# Patient Record
Sex: MALE | NOT HISPANIC OR LATINO | Employment: OTHER | URBAN - NONMETROPOLITAN AREA
[De-identification: names, ages, dates, MRNs, and addresses within clinical notes are randomized per-mention and may not be internally consistent; named-entity substitution may affect disease eponyms.]

---

## 2023-02-16 ENCOUNTER — HOSPITAL ENCOUNTER (EMERGENCY)
Dept: DATA CONVERSION | Facility: HOSPITAL | Age: 88
Discharge: ANOTHER HOSPITAL | End: 2023-02-16
Attending: FAMILY MEDICINE | Admitting: INTERNAL MEDICINE

## 2023-02-16 DIAGNOSIS — Z79.82 LONG TERM (CURRENT) USE OF ASPIRIN: ICD-10-CM

## 2023-02-16 DIAGNOSIS — S00.83XA CONTUSION OF OTHER PART OF HEAD, INITIAL ENCOUNTER: ICD-10-CM

## 2023-02-16 DIAGNOSIS — Z79.899 OTHER LONG TERM (CURRENT) DRUG THERAPY: ICD-10-CM

## 2023-02-16 DIAGNOSIS — H57.89 OTHER SPECIFIED DISORDERS OF EYE AND ADNEXA: ICD-10-CM

## 2023-02-16 DIAGNOSIS — Z20.822 CONTACT WITH AND (SUSPECTED) EXPOSURE TO COVID-19: ICD-10-CM

## 2023-02-16 DIAGNOSIS — Z51.5 ENCOUNTER FOR PALLIATIVE CARE: ICD-10-CM

## 2023-02-16 DIAGNOSIS — E03.9 HYPOTHYROIDISM, UNSPECIFIED: ICD-10-CM

## 2023-02-16 DIAGNOSIS — Z95.3 PRESENCE OF XENOGENIC HEART VALVE: ICD-10-CM

## 2023-02-16 DIAGNOSIS — J01.90 ACUTE SINUSITIS, UNSPECIFIED: ICD-10-CM

## 2023-02-16 DIAGNOSIS — R26.9 UNSPECIFIED ABNORMALITIES OF GAIT AND MOBILITY: ICD-10-CM

## 2023-02-16 DIAGNOSIS — L50.9 URTICARIA, UNSPECIFIED: ICD-10-CM

## 2023-02-16 DIAGNOSIS — R60.0 LOCALIZED EDEMA: ICD-10-CM

## 2023-02-16 DIAGNOSIS — R94.31 ABNORMAL ELECTROCARDIOGRAM (ECG) (EKG): ICD-10-CM

## 2023-02-16 DIAGNOSIS — S50.819A ABRASION OF UNSPECIFIED FOREARM, INITIAL ENCOUNTER: ICD-10-CM

## 2023-02-16 DIAGNOSIS — R22.0 LOCALIZED SWELLING, MASS AND LUMP, HEAD: ICD-10-CM

## 2023-02-16 DIAGNOSIS — S09.90XA UNSPECIFIED INJURY OF HEAD, INITIAL ENCOUNTER: ICD-10-CM

## 2023-02-16 DIAGNOSIS — M62.82 RHABDOMYOLYSIS: ICD-10-CM

## 2023-02-16 DIAGNOSIS — S09.8XXA OTHER SPECIFIED INJURIES OF HEAD, INITIAL ENCOUNTER: ICD-10-CM
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
## 2023-02-16 DIAGNOSIS — Z66 DO NOT RESUSCITATE: ICD-10-CM
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
## 2023-02-16 DIAGNOSIS — R77.8 OTHER SPECIFIED ABNORMALITIES OF PLASMA PROTEINS: ICD-10-CM

## 2023-02-16 DIAGNOSIS — Z95.0 PRESENCE OF CARDIAC PACEMAKER: ICD-10-CM

## 2023-02-16 LAB
ALANINE AMINOTRANSFERASE (SGPT) (U/L) IN SER/PLAS: 19 U/L (ref 10–52)
ALBUMIN (G/DL) IN SER/PLAS: 3.7 G/DL (ref 3.4–5)
ALKALINE PHOSPHATASE (U/L) IN SER/PLAS: 83 U/L (ref 33–136)
ANION GAP IN SER/PLAS: 15 MMOL/L (ref 10–20)
ASPARTATE AMINOTRANSFERASE (SGOT) (U/L) IN SER/PLAS: 38 U/L (ref 9–39)
BASOPHILS (10*3/UL) IN BLOOD BY AUTOMATED COUNT: 0.01 X10E9/L (ref 0–0.1)
BASOPHILS/100 LEUKOCYTES IN BLOOD BY AUTOMATED COUNT: 0.1 % (ref 0–2)
BILIRUBIN TOTAL (MG/DL) IN SER/PLAS: 1.1 MG/DL (ref 0–1.2)
CALCIUM (MG/DL) IN SER/PLAS: 9.1 MG/DL (ref 8.6–10.3)
CARBON DIOXIDE, TOTAL (MMOL/L) IN SER/PLAS: 26 MMOL/L (ref 21–32)
CHLORIDE (MMOL/L) IN SER/PLAS: 102 MMOL/L (ref 98–107)
CREATINE KINASE (U/L) IN SER/PLAS: 718 U/L (ref 0–325)
CREATININE (MG/DL) IN SER/PLAS: 1.09 MG/DL (ref 0.5–1.3)
ERYTHROCYTE DISTRIBUTION WIDTH (RATIO) BY AUTOMATED COUNT: 13.7 % (ref 11.5–14.5)
ERYTHROCYTE MEAN CORPUSCULAR HEMOGLOBIN CONCENTRATION (G/DL) BY AUTOMATED: 33 G/DL (ref 32–36)
ERYTHROCYTE MEAN CORPUSCULAR VOLUME (FL) BY AUTOMATED COUNT: 96 FL (ref 80–100)
ERYTHROCYTES (10*6/UL) IN BLOOD BY AUTOMATED COUNT: 4.27 X10E12/L (ref 4.5–5.9)
FLU A RESULT: NOT DETECTED
FLU B RESULT: NOT DETECTED
GFR MALE: 64 ML/MIN/1.73M2
GLUCOSE (MG/DL) IN SER/PLAS: 120 MG/DL (ref 74–99)
HEMATOCRIT (%) IN BLOOD BY AUTOMATED COUNT: 41.2 % (ref 41–52)
HEMOGLOBIN (G/DL) IN BLOOD: 13.6 G/DL (ref 13.5–17.5)
IMMATURE GRANULOCYTES/100 LEUKOCYTES IN BLOOD BY AUTOMATED COUNT: 0.6 % (ref 0–0.9)
INR IN PPP BY COAGULATION ASSAY: 1.3 (ref 0.9–1.1)
LEUKOCYTES (10*3/UL) IN BLOOD BY AUTOMATED COUNT: 7.8 X10E9/L (ref 4.4–11.3)
LYMPHOCYTES (10*3/UL) IN BLOOD BY AUTOMATED COUNT: 0.26 X10E9/L (ref 0.8–3)
LYMPHOCYTES/100 LEUKOCYTES IN BLOOD BY AUTOMATED COUNT: 3.4 % (ref 13–44)
MAGNESIUM (MG/DL) IN SER/PLAS: 2.04 MG/DL (ref 1.6–2.4)
MONOCYTES (10*3/UL) IN BLOOD BY AUTOMATED COUNT: 0.52 X10E9/L (ref 0.05–0.8)
MONOCYTES/100 LEUKOCYTES IN BLOOD BY AUTOMATED COUNT: 6.7 % (ref 2–10)
NEUTROPHILS (10*3/UL) IN BLOOD BY AUTOMATED COUNT: 6.91 X10E9/L (ref 1.6–5.5)
NEUTROPHILS/100 LEUKOCYTES IN BLOOD BY AUTOMATED COUNT: 89.2 % (ref 40–80)
PLATELETS (10*3/UL) IN BLOOD AUTOMATED COUNT: 186 X10E9/L (ref 150–450)
POTASSIUM (MMOL/L) IN SER/PLAS: 4.5 MMOL/L (ref 3.5–5.3)
PROTEIN TOTAL: 6.3 G/DL (ref 6.4–8.2)
PROTHROMBIN TIME (PT) IN PPP BY COAGULATION ASSAY: 15.2 SEC (ref 9.8–13.4)
SARS-COV-2 RESULT: NOT DETECTED
SODIUM (MMOL/L) IN SER/PLAS: 138 MMOL/L (ref 136–145)
TROPONIN I, HIGH SENSITIVITY: 120 NG/L (ref 0–20)
TROPONIN I, HIGH SENSITIVITY: 138 NG/L (ref 0–20)
TROPONIN I, HIGH SENSITIVITY: 149 NG/L (ref 0–20)
TROPONIN I, HIGH SENSITIVITY: 161 NG/L (ref 0–20)
UREA NITROGEN (MG/DL) IN SER/PLAS: 29 MG/DL (ref 6–23)

## 2023-02-17 LAB
ANION GAP IN SER/PLAS: 8 MMOL/L (ref 10–20)
CALCIUM (MG/DL) IN SER/PLAS: 8.3 MG/DL (ref 8.6–10.3)
CARBON DIOXIDE, TOTAL (MMOL/L) IN SER/PLAS: 27 MMOL/L (ref 21–32)
CHLORIDE (MMOL/L) IN SER/PLAS: 106 MMOL/L (ref 98–107)
CREATININE (MG/DL) IN SER/PLAS: 0.88 MG/DL (ref 0.5–1.3)
ERYTHROCYTE DISTRIBUTION WIDTH (RATIO) BY AUTOMATED COUNT: 13.8 % (ref 11.5–14.5)
ERYTHROCYTE MEAN CORPUSCULAR HEMOGLOBIN CONCENTRATION (G/DL) BY AUTOMATED: 33 G/DL (ref 32–36)
ERYTHROCYTE MEAN CORPUSCULAR VOLUME (FL) BY AUTOMATED COUNT: 97 FL (ref 80–100)
ERYTHROCYTES (10*6/UL) IN BLOOD BY AUTOMATED COUNT: 4.14 X10E12/L (ref 4.5–5.9)
GFR MALE: 81 ML/MIN/1.73M2
GLUCOSE (MG/DL) IN SER/PLAS: 82 MG/DL (ref 74–99)
HEMATOCRIT (%) IN BLOOD BY AUTOMATED COUNT: 40 % (ref 41–52)
HEMOGLOBIN (G/DL) IN BLOOD: 13.2 G/DL (ref 13.5–17.5)
LEUKOCYTES (10*3/UL) IN BLOOD BY AUTOMATED COUNT: 6.2 X10E9/L (ref 4.4–11.3)
PLATELETS (10*3/UL) IN BLOOD AUTOMATED COUNT: 175 X10E9/L (ref 150–450)
POTASSIUM (MMOL/L) IN SER/PLAS: 4.1 MMOL/L (ref 3.5–5.3)
SODIUM (MMOL/L) IN SER/PLAS: 137 MMOL/L (ref 136–145)
TROPONIN I, HIGH SENSITIVITY: 126 NG/L (ref 0–20)
TROPONIN I, HIGH SENSITIVITY: 143 NG/L (ref 0–20)
UREA NITROGEN (MG/DL) IN SER/PLAS: 26 MG/DL (ref 6–23)

## 2023-03-03 NOTE — H&P
History of Present Illness:   HPI:    ELLIOT CAAL is a 89 year old patient who normally lives with his wife; however, his wife is sick and hospitalized at [facility] . Patient was home alone, he fell  last night reportedly between 7 and 8 and has been on the floor all night.  He was brought to the emergency room for evaluation with history after he fell hit his head against the table in the kitchen was on the floor was unable to get up.  Patient daughter  went to check on him and found him on the floor and stated that he was fine last night at 7:30 PM when she talked to him of the phone last night/  She found him on the phone with swelling of the face his eyes were all shut down due to edema of the face.  He could only give limited history denies any neck pain chest pain or short of breath.  He was noted abrasion of forearm.  Possibly when he tried  to get up.  Patient is not using any blood thinner.    In the ED, Repeat EKG done at 1525 hrs. showed pacer rate of 81.  Lateral infarct age undetermined.  No ST-T wave elevation.  No STEMI.  Abnormal EKG.  I read this EKG.    The patient CT of the head and C-spine as well as facial bones showed no acute fracture. He was found to have acute sinusitis but no posttraumatic abnormality except soft tissue swelling of the face and isolated from part of body being on the floor for  prolonged period of time.  CT of the chest abdomen pelvis showed no acute intra thoracic or abdominal posttraumatic abnormality CT, see CT report for details.  Patient troponin however was 120, 128 and 139 slightly creeping up.  His EKG showed pacemaker rhythm with nonspecific T wave changes no STEMI.    Rest of the labs are also reviewed with the patient and family.    Patient gradually became more awake his puffiness and swelling is markedly improved he is able to open his eyes, the puffiness and edema has markedly improved when he put his head in upright position causing edema to  decrease.  He was able to talk.  Patient  expressed desire for no intervention.  Initial discussion about transferring patient to higher level facility surgery [place] with decline in the family initially thought about sending patient [facility] or [facility]. [facility] declined to take the patient  reported request [facility] however patient and family decided no intervention at no cardiac cath no intervention no surgery they want patient to be DNR Comfort Care arrest and do not want any interventional procedure.  Not even cardiac cath  evaluation.  They agree they want him only comfort care arrest with only symptomatic treatment.  Patient and family understood the serious nature of the decision and major impact about patient medical care, they understood risk and benefit well patient  does not want any intervention or procedures and want comfort care and symptomatic treatment only.  Since he lives alone he cannot go home his wife in the hospital and is not safe to be home alone and they decided patient to be admitted to [facility]  for observation care and if gets discharged he will go home with her otherwise stable discussed different options including placement to skilled care facility or rehab.  Patient without any chest pain or short of breath.  He is being admitted for further  care.  But no cardiac intervention and no surgery per patient family and patient did not instructions on the DNR Comfort Care arrest.     Patient is admitted to medicine for possible placement and/or rehab    Family History:   Family History: reviewed and not pertinent to presenting  problem     Social History:   Social History:  Smoking Status never smoker (1)   Alcohol Use denies(1)   Drug Use denies (1)              Allergies:  ·  No Known Allergies :     Medications Prior to Admission:     levothyroxine 200 mcg (0.2 mg) oral tablet: 1 tab(s) orally once a day  Tylenol 325 mg oral capsule: 2 tab(s) orally every 6 hours, As  Needed  Aspir 81 oral delayed release tablet: 1 tab(s) orally once a day  Erleada 60 mg oral tablet: 4 tab(s) orally once a day  Colace 100 mg oral capsule: 1 cap(s) orally once a day, As Needed  lactulose 10 g/15 mL oral syrup: 15 milliliter(s) orally once a day (at bedtime), As Needed  mirtazapine 15 mg oral tablet: 1 tab(s) orally once a day (at bedtime)  furosemide 40 mg oral tablet: 1 tab(s) orally once a day  spironolactone 25 mg oral tablet: 1 tab(s) orally once a day  Os-Claude 250 with D oral tablet: 1 tab(s) orally 2 times a day  Vimpat 150 mg oral tablet: 1 tab(s) orally 2 times a day.    Review of Systems:   Constitutional: NEGATIVE: Fever, Chills, Anorexia,  Weight Loss, Malaise     Eyes: NEGATIVE: Blurry Vision, Drainage, Diploplia,  Redness, Vision Loss/ Change     ENMT: NEGATIVE: Nasal Discharge, Nasal Congestion,  Ear Pain, Mouth Pain, Throat Pain     Respiratory: NEGATIVE: Dry Cough, Productive Cough,  Hemoptysis, Wheezing, Shortness of Breath     Cardiac: NEGATIVE: Chest Pain, Dyspnea on Exertion,  Orthopnea, Palpitations, Syncope     Gastrointestinal: NEGATIVE: Nausea, Vomiting, Diarrhea,  Constipation, Abdominal Pain     Genitourinary: NEGATIVE: Discharge, Dysuria, Flank  Pain, Frequency, Hematuria     Musculoskeletal: NEGATIVE: Decreased ROM, Pain, Swelling,  Stiffness, Weakness     Neurological: NEGATIVE: Dizziness, Confusion, Headache,  Seizures, Syncope     Psychiatric: NEGATIVE: Mood Changes, Anxiety, Hallucinations,  Sleep Changes, Suicidal Ideas     Skin: NEGATIVE: Mass, Pain, Pruritus, Rash, Ulcer     Endocrine: NEGATIVE: Heat Intolerance, Cold Intolerance,  Sweat, Polyuria, Thirst     Hematologic/Lymph: NEGATIVE: Anemia, Bruising, Easy  Bleeding, Night Sweats, Petechiae       Objective:     Objective Information:      T   P  R  BP   MAP  SpO2   Value  36.2  62  18  91/43   72  98%  Date/Time 2/17 8:00 2/17 8:00 2/17 8:00 2/17 8:00  2/16 17:26 2/17 8:00  Range  (36.2C - 37C )  (62 - 82 )   (14 - 20 )  (76 - 105 )/ (43 - 67 )  (72 - 77 )  (87% - 100% )   As of 17-Feb-2023 03:00:00, patient is on 3 L/min of oxygen via nasal cannula with humidification.  Highest temp of 37 C was recorded at 2/16 11:57      Pain reported at 2/16 20:00: 0 = None    Physical Exam by System:    Constitutional: Well developed, awake/alert/oriented  x3, no distress, alert and cooperative   Eyes: PERRL, EOMI, clear sclera   ENMT: mucous membranes moist, no apparent injury,  no lesions seen   Head/Neck: Neck supple, no apparent injury, thyroid  without mass or tenderness, No JVD, trachea midline, no bruits   Respiratory/Thorax: Patent airways, CTAB, normal  breath sounds with good chest expansion, thorax symmetric   Cardiovascular: Regular, rate and rhythm, no murmurs,  2+ equal pulses of the extremities, normal S 1and S 2   Gastrointestinal: Nondistended, soft, non-tender,  no rebound tenderness or guarding, no masses palpable, no organomegaly, +BS, no bruits   Genitourinary: No Discharge, vesicles or other abnormalities   Musculoskeletal: ROM intact, no joint swelling, normal  strength   Extremities: normal extremities, no cyanosis edema,  contusions or wounds, no clubbing   Neurological: alert and oriented x3, intact senses,  motor, response and reflexes, normal strength   Skin: Warm and dry, no lesions, no rashes     Medications:    Medications:          Continuous Medications       --------------------------------    1. Sodium Chloride 0.9% Infusion:  1000  mL  IntraVenous  <Continuous>         Scheduled Medications       --------------------------------    1. Aspirin Enteric Coated:  81  mg  Oral  Daily    2. Calcium 500 mg - Vitamin D 200 Units:  1  tablet(s)  Oral  2 Times a Day    3. Furosemide:  40  mg  Oral  Daily    4. Lacosamide:  150  mg  Oral  2 Times a Day    5. Lactulose  Oral Liquid - PEDS:  10  gram(s)  Oral  Every Night    6. Levothyroxine:  200  microgram(s)  Oral  Daily    7. Mirtazapine  - PEDS:  15  mg   Oral  Every Night    8. Spironolactone:  25  mg  Oral  Daily         PRN Medications       --------------------------------    1. Acetaminophen:  650  mg  Oral  Every 4 Hours    2. Acetaminophen:  650  mg  Oral  Every 4 Hours    3. Docusate:  100  mg  Oral  2 Times a Day    4. Ondansetron Injectable:  4  mg  IntraVenous Push  Every 4 Hours    5. Sodium Chloride 0.9% Injectable Flush:  10  mL  IntraVenous Flush  Every 8 Hours and as Needed        Recent Lab Results:    Results:    CBC: 2/17/2023 03:35              \     Hgb     /                              \     13.2 L    /  WBC  ----------------  Plt               6.2       ----------------    175              /     Hct     \                              /     40.0 L    \            RBC: 4.14 L    MCV: 97     Neutrophil %: 89.2      BMP: 2/17/2023 03:35  NA+        Cl-     BUN  /                         137    106    26 H /  --------------------------------  Glucose                ---------------------------  82    K+     HCO3-   Creat \                         4.1  27    0.88  \  Calcium : 8.3 L    Anion Gap : 8 L      CMP: 2/16/2023 12:15  NA+        Cl-     BUN  /                         138    102    29 H /  --------------------------------  Glucose                ---------------------------  120 H    K+     HCO3-   Creat \                         4.5    26    1.09  \           \  T Bili  /                    \  1.1  /  AST  x ---- x ALT        38 x ---- x 19         /  Alk P   \               /  83  \  Calcium : 9.1     Anion Gap : 15     Albumin : 3.7     T Protein : 6.3 L          Coagulation: 2/16/2023 12:15  PT  /                    15.2 H /  -------<    INR          ----------<      1.3 H  PTT\                              \                         I have reviewed these laboratory results:    Complete Blood Count  17-Feb-2023 03:35:00      Result Value    White Blood Cell Count  6.2    Red Blood Cell Count  4.14   L   HGB  13.2   L   HCT   40.0   L   MCV  97    MCHC  33.0    PLT  175    RDW-CV  13.8      Basic Metabolic Panel  17-Feb-2023 03:35:00      Result Value    Glucose, Serum  82    NA  137    K  4.1    CL  106    Bicarbonate, Serum  27    Anion Gap, Serum  8   L   BUN  26   H   CREAT  0.88    GFR Male  81    Calcium, Serum  8.3   L     Troponin I, High Sensitivity  Trending View      Result 17-Feb-2023 03:35:00  17-Feb-2023 00:35:00  16-Feb-2023 21:33:00  16-Feb-2023 15:23:00  16-Feb-2023 13:20:00    Troponin I, High Sensitivity 126   H   143   H   161   H   149   H   138   H        Influenza A/B,Covid 2019 PCR,Symptomatic  16-Feb-2023 12:43:00      Result Value    Fluid Source  Nasal, Nasopharyngeal    Influenza A PCR  NOT DETECTED    Reference Range: Not Detected Respiratory virus testing is performed routinely by PCR    for Influenza A/B and RSV.    Not Detected results do not preclude Influenza A/B or RSV   infections since the adequacy of sample collection or lo    Influenza B PCR  NOT DETECTED    Reference Range: Not Detected Respiratory virus testing is performed routinely by PCR    for Influenza A/B and RSV.    Not Detected results do not preclude Influenza A/B or RSV   infections since the adequacy of sample collection or lo    Coronavirus 2019,PCR  NOT DETECTED    Reference Range: Not Detected .  This test has received FDA Emergency Use Authorization (EUA) and has been   verified by [facility]. This test is only   authorized for the duration of time that circums      Complete Blood Count + Differential  16-Feb-2023 12:15:00      Result Value    White Blood Cell Count  7.8    Red Blood Cell Count  4.27   L   HGB  13.6    HCT  41.2    MCV  96    MCHC  33.0    PLT  186    RDW-CV  13.7    Neutrophil %  89.2    Immature Granulocytes %  0.6    Lymphocyte %  3.4    Monocyte %  6.7    Basophil %  0.1    Neutrophil Count  6.91   H   Lymphocyte Count  0.26   L   Monocyte Count  0.52    Basophil Count  0.01       Comprehensive Metabolic Panel  16-Feb-2023 12:15:00      Result Value    Glucose, Serum  120   H   NA  138    K  4.5    CL  102    Bicarbonate, Serum  26    Anion Gap, Serum  15    BUN  29   H   CREAT  1.09    GFR Male  64    Calcium, Serum  9.1    ALB  3.7    ALKP  83    T Pro  6.3   L   T Bili  1.1    Alanine Aminotransferase, Serum  19    Aspartate Transaminase, Serum  38        Radiology Results:    Results:    No Results have been selected.  Please select Results from the Available Results list before marking as Reviewed.      Impression:    No CT evidence of facial bone fracture in this exam.     Nonspecific edema throughout the subcutaneous fat the face (mildly  greater on the right than on the left) and the neck (prominently  greater on the right than on the left).     Paranasal sinusitis as described.     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     CT Image Reconstruction 3D Volume and MIP [Feb 16 2023  1:39PM]      Impression:    No CT evidence of facial bone fracture in this exam.     Nonspecific edema throughout the subcutaneous fat the face (mildly  greater on the right than on the left) and the neck (prominently  greater on the right than on the left).     Paranasal sinusitis as described.     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     CT Facial Bones [Feb 16 2023  1:36PM]      Impression:    No CT evidence of cervical spine fracture in this exam.     Cervical spine DJD throughout as described.     Diffuse edema throughout the subcutaneous fat the neck, right much  greater.     CT C Spine without Contrast [Feb 16 2023  1:28PM]      Impression:    CHEST:  The exam was limited by non use of IVcontrast.     Old inferolateral right rib fracture deformities. Stable anterior  wedge deformity at T12. No acute, displaced rib fracture either side.  No acute thoracic spine fracture.     Previous heart surgery. Cardiomegaly.     Diffuse edema throughout the  subcutaneous fat of the chest wall,  right greater than left. Also, there are bilateral pleural effusions  which are mild-to-moderate in size, partially loculated on right.  There is atelectasis in both lower lobes and laterally on right  throughout the right chest, mild right-sided volume. Question patient  laying on the right side when found. Please correlate clinically.     Mild stable aneurysm the ascending aorta. Suspected underlying  pulmonary artery hypertension.        ABDOMEN/PELVIS:  The exam was limited by non use of oral or IV contrast.     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 Mild DJD in both hips  and both SI joints. No fracture of the lumbar spine or pelvis based  this exam.     Moderate diffuse retained colonic stool.     Mild diffuse mesenteric edema and mild diffuse edema throughout the  subcutaneous fat. Suspect anasarca. Cannot exclude mild free pelvic.     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 There  are multiple stones in mural diverticula laterally and posteriorly on  the right. No intraluminal bladder stone.     Several tiny nonobstructing bilateral renal stones.     Moderate aortoiliac calcifications.     Arthritic changes in the spine and pelvis as described. No CT  evidence of lumbar spine or pelvic fracture.     CT Chest Abdomen Pelvis without Contrast [Feb 16 2023  1:24PM]      Impression:    Advanced degenerative changes of the right wrist as described.     Ossific density dorsal to the carpal bones may be due to triquetral  injury of indeterminate age.     Xray Wrist Complete Min 3 View [Feb 16 2023  1:13PM]      Impression:    No evidence of acute cortical infarct or intracranial hemorrhage.     No evidence of intracranial hemorrhage or displaced skullfracture.     CT Head without Contrast [Feb 16 2023 12:46PM]      Assessment and Plan:   Assessment:    ELLIOT CAAL is a 89 year old patient who normally lives with his wife; however, his wife is sick  and hospitalized at [facility] . Patient was home alone, he fell  last night reportedly between 7 and 8 and has been on the floor all night.  He was brought to the emergency room for evaluation with history after he fell hit his head against the table in the kitchen was on the floor was unable to get up.  Patient daughter  went to check on him and found him on the floor and stated that he was fine last night at 7:30 PM when she talked to him of the phone last night/  She found him on the phone with swelling of the face his eyes were all shut down due to edema of the face.  He could only give limited history denies any neck pain chest pain or short of breath.  He was noted abrasion of forearm.  Possibly when he tried  to get up.  Patient is not using any blood thinner.    In the ED, Repeat EKG done at 1525 hrs. showed pacer rate of 81.  Lateral infarct age undetermined.  No ST-T wave elevation.  No STEMI.  Abnormal EKG.  I read this EKG.    The patient CT of the head and C-spine as well as facial bones showed no acute fracture. He was found to have acute sinusitis but no posttraumatic abnormality except soft tissue swelling of the face and isolated from part of body being on the floor for  prolonged period of time.  CT of the chest abdomen pelvis showed no acute intra thoracic or abdominal posttraumatic abnormality CT, see CT report for details.  Patient troponin however was 120, 128 and 139 slightly creeping up.  His EKG showed pacemaker rhythm with nonspecific T wave changes no STEMI.    Rest of the labs are also reviewed with the patient and family.    Patient gradually became more awake his puffiness and swelling is markedly improved he is able to open his eyes, the puffiness and edema has markedly improved when he put his head in upright position causing edema to decrease.  He was able to talk.  Patient  expressed desire for no intervention.  Initial discussion about transferring patient to higher level  facility surgery [place] with decline in the family initially thought about sending patient [facility] or [facility]. [facility] declined to take the patient  reported request [facility] however patient and family decided no intervention at no cardiac cath no intervention no surgery they want patient to be DNR Comfort Care arrest and do not want any interventional procedure.  Not even cardiac cath  evaluation.  They agree they want him only comfort care arrest with only symptomatic treatment.  Patient and family understood the serious nature of the decision and major impact about patient medical care, they understood risk and benefit well patient  does not want any intervention or procedures and want comfort care and symptomatic treatment only.  Since he lives alone he cannot go home his wife in the hospital and is not safe to be home alone and they decided patient to be admitted to [facility]  for observation care and if gets discharged he will go home with her otherwise stable discussed different options including placement to skilled care facility or rehab.  Patient without any chest pain or short of breath.  He is being admitted for further  care.  But no cardiac intervention and no surgery per patient family and patient did not instructions on the DNR Comfort Care arrest.     Patient is admitted to medicine for possible placement and/or rehab    1) deconditioning and mechanical fall- no permanent trauma; no bleeding or fracture-  continue PT and OT; will qualify for swing    2) Rhabdomyolysis.  I do not see a cpk order, so I will recheck and monitor renal function    3) gait abnormality- continue current management; PT/OT- patient would be good candidate for SNF/rehab      Electronic Signatures:  Kuldeep Gonzalez)  (Signed 17-Feb-2023 15:05)   Authored: History of Present Illness, Comorbidities,  Family History, Social History, Allergies, Medications Prior to Admission, Review of Systems, Objective,  "Assessment and Plan, Note Completion      Last Updated: 17-Feb-2023 15:05 by Kuldeep Gonzalez)    References:  1.  Data Referenced From \"Patient Profile - Adult v2\" 16-Feb-2023 18:26   "

## 2023-03-03 NOTE — DISCHARGE SUMMARY
Send Summary:   Discharge Summary Providers:  Provider Role Provider Name Specialty   · Referring [facility] So[facility] Medicine   · Attending Kuldeep Gonzalez    · Primary Vida Barber        Note Recipients: pcp       Discharge:    Summary:   Admission Date: .16-Feb-2023 11:52:00   Discharge Date: 17-Feb-2023   Attending Physician at Discharge: Kuldeep Gonzalez   Admission Reason: fall; rhabdo   Final Discharge Diagnoses: Closed head injury; rhabdo;  elevated troponin   Procedures: none   Condition at Discharge: Satisfactory   Disposition at Discharge: Swing Bed   Vital Signs:        T   P  R  BP   MAP  SpO2   Value  36.2  62  18  91/43   72  98%  Date/Time 2/17 8:00 2/17 8:00 2/17 8:00 2/17 8:00  2/16 17:26 2/17 8:00  Range  (36.2C - 37C )  (62 - 82 )  (14 - 20 )  (76 - 105 )/ (43 - 67 )  (72 - 77 )  (87% - 100% )   As of 17-Feb-2023 03:00:00, patient is on 3 L/min of oxygen via nasal cannula with humidification.  Highest temp of 37 C was recorded at 2/16 11:57    Date:            Weight/Scale Type:  Height:   16-Feb-2023 18:26  63.3  kg / bed  177.8  cm  Hospital Course:    ELLIOT CAAL is a 89 year old patient who normally lives with his wife; however, his wife is sick and hospitalized at [facility] . Patient was home alone, he fell  last night reportedly between 7 and 8 and has been on the floor all night.  He was brought to the emergency room for evaluation with history after he fell hit his head against the table in the kitchen was on the floor was unable to get up.  Patient daughter  went to check on him and found him on the floor and stated that he was fine last night at 7:30 PM when she talked to him of the phone last night/  She found him on the phone with swelling of the face his eyes were all shut down due to edema of the face.  He could only give limited history denies any neck pain chest pain or short of breath.  He was noted abrasion of forearm.  Possibly when he tried  to get up.  Patient is not  using any blood thinner.    In the ED, Repeat EKG done at 1525 hrs. showed pacer rate of 81.  Lateral infarct age undetermined.  No ST-T wave elevation.  No STEMI.  Abnormal EKG.  I read this EKG.    The patient CT of the head and C-spine as well as facial bones showed no acute fracture. He was found to have acute sinusitis but no posttraumatic abnormality except soft tissue swelling of the face and isolated from part of body being on the floor for  prolonged period of time.  CT of the chest abdomen pelvis showed no acute intra thoracic or abdominal posttraumatic abnormality CT, see CT report for details.  Patient troponin however was 120, 128 and 139 slightly creeping up.  His EKG showed pacemaker rhythm with nonspecific T wave changes no STEMI.    Rest of the labs are also reviewed with the patient and family.    Patient gradually became more awake his puffiness and swelling is markedly improved he is able to open his eyes, the puffiness and edema has markedly improved when he put his head in upright position causing edema to decrease.  He was able to talk.  Patient  expressed desire for no intervention.  Initial discussion about transferring patient to higher level facility surgery [facility] with decline in the family initially thought about sending patient [facility] or [facility]. [facility] declined to take the patient  reported request [facility] however patient and family decided no intervention at no cardiac cath no intervention no surgery they want patient to be DNR Comfort Care arrest and do not want any interventional procedure.  Not even cardiac cath  evaluation.  They agree they want him only comfort care arrest with only symptomatic treatment.  Patient and family understood the serious nature of the decision and major impact about patient medical care, they understood risk and benefit well patient  does not want any intervention or procedures and want comfort care and symptomatic treatment only.   Since he lives alone he cannot go home his wife in the hospital and is not safe to be home alone and they decided patient to be admitted to [facility]  for observation care and if gets discharged he will go home with her otherwise stable discussed different options including placement to skilled care facility or rehab.  Patient without any chest pain or short of breath.  He is being admitted for further  care.  But no cardiac intervention and no surgery per patient family and patient did not instructions on the DNR Comfort Care arrest.     Patient is admitted to medicine for possible placement and/or rehab    1) deconditioning and mechanical fall- no permanent trauma; no bleeding or fracture-  continue PT and OT; will qualify for swing    2) Rhabdomyolysis.  I do not see a cpk order, so I will recheck and monitor renal function    3) gait abnormality- continue current management; PT/OT- patient would be good candidate for SNF/rehab    Immunizations:    Immunizations:  25-Oct-2020   Tdap: Immunizations, 25-Oct-2020      Discharge Information:    and Continuing Care:   Lab Results - Pending:    None  Radiology Results - Pending: None   Discharge Instructions:    a  Discharge Medications: a   Issues to Discuss at Follow-up / Goals for Continuing Care:    a      Electronic Signatures:  Kuldeep Gonzalez)  (Signed 17-Feb-2023 15:14)   Authored: Send Summary, Summary Content, Immunizations,  Ongoing Care, Note Completion      Last Updated: 17-Feb-2023 15:14 by Kuldeep Gonzalez)

## 2023-09-14 NOTE — DISCHARGE SUMMARY
Send Summary:   Discharge Summary Providers:  Provider Role Provider Name Specialty   · Referring Angelica Soares Medicine   · Attending Kuldeep Gonzalez    · Primary Vida Barber        Note Recipients: pcp       Discharge:    Summary:   Admission Date: .16-Feb-2023 11:52:00   Discharge Date: 17-Feb-2023   Attending Physician at Discharge: Kuldeep Gonzalez   Admission Reason: fall; rhabdo   Final Discharge Diagnoses: Closed head injury; rhabdo;  elevated troponin   Procedures: none   Condition at Discharge: Satisfactory   Disposition at Discharge: Swing Bed   Vital Signs:        T   P  R  BP   MAP  SpO2   Value  36.2  62  18  91/43   72  98%  Date/Time 2/17 8:00 2/17 8:00 2/17 8:00 2/17 8:00  2/16 17:26 2/17 8:00  Range  (36.2C - 37C )  (62 - 82 )  (14 - 20 )  (76 - 105 )/ (43 - 67 )  (72 - 77 )  (87% - 100% )   As of 17-Feb-2023 03:00:00, patient is on 3 L/min of oxygen via nasal cannula with humidification.  Highest temp of 37 C was recorded at 2/16 11:57    Date:            Weight/Scale Type:  Height:   16-Feb-2023 18:26  63.3  kg / bed  177.8  cm  Hospital Course:    ELLIOT CAAL is a 89 year old patient who normally lives with his wife; however, his wife is sick and hospitalized at [facility] . Patient was home alone, he fell  last night reportedly between 7 and 8 and has been on the floor all night.  He was brought to the emergency room for evaluation with history after he fell hit his head against the table in the kitchen was on the floor was unable to get up.  Patient daughter  went to check on him and found him on the floor and stated that he was fine last night at 7:30 PM when she talked to him of the phone last night/  She found him on the phone with swelling of the face his eyes were all shut down due to edema of the face.  He could only give limited history denies any neck pain chest pain or short of breath.  He was noted abrasion of forearm.  Possibly when he tried  to get up.  Patient is not  using any blood thinner.    In the ED, Repeat EKG done at 1525 hrs. showed pacer rate of 81.  Lateral infarct age undetermined.  No ST-T wave elevation.  No STEMI.  Abnormal EKG.  I read this EKG.    The patient CT of the head and C-spine as well as facial bones showed no acute fracture. He was found to have acute sinusitis but no posttraumatic abnormality except soft tissue swelling of the face and isolated from part of body being on the floor for  prolonged period of time.  CT of the chest abdomen pelvis showed no acute intra thoracic or abdominal posttraumatic abnormality CT, see CT report for details.  Patient troponin however was 120, 128 and 139 slightly creeping up.  His EKG showed pacemaker rhythm with nonspecific T wave changes no STEMI.    Rest of the labs are also reviewed with the patient and family.    Patient gradually became more awake his puffiness and swelling is markedly improved he is able to open his eyes, the puffiness and edema has markedly improved when he put his head in upright position causing edema to decrease.  He was able to talk.  Patient  expressed desire for no intervention.  Initial discussion about transferring patient to higher level facility surgery [facility] with decline in the family initially thought about sending patient [facility] or [facility]. [facility] declined to take the patient  reported request [facility] however patient and family decided no intervention at no cardiac cath no intervention no surgery they want patient to be DNR Comfort Care arrest and do not want any interventional procedure.  Not even cardiac cath  evaluation.  They agree they want him only comfort care arrest with only symptomatic treatment.  Patient and family understood the serious nature of the decision and major impact about patient medical care, they understood risk and benefit well patient  does not want any intervention or procedures and want comfort care and symptomatic treatment only.   Since he lives alone he cannot go home his wife in the hospital and is not safe to be home alone and they decided patient to be admitted to [facility]  for observation care and if gets discharged he will go home with her otherwise stable discussed different options including placement to skilled care facility or rehab.  Patient without any chest pain or short of breath.  He is being admitted for further  care.  But no cardiac intervention and no surgery per patient family and patient did not instructions on the DNR Comfort Care arrest.     Patient is admitted to medicine for possible placement and/or rehab    1) deconditioning and mechanical fall- no permanent trauma; no bleeding or fracture-  continue PT and OT; will qualify for swing    2) Rhabdomyolysis.  I do not see a cpk order, so I will recheck and monitor renal function    3) gait abnormality- continue current management; PT/OT- patient would be good candidate for SNF/rehab    Immunizations:    Immunizations:  25-Oct-2020   Tdap: Immunizations, 25-Oct-2020      Discharge Information:    and Continuing Care:   Lab Results - Pending:    None  Radiology Results - Pending: None   Discharge Instructions:    a  Discharge Medications: a   Issues to Discuss at Follow-up / Goals for Continuing Care:    a      Electronic Signatures:  Kuldeep Gonzalez)  (Signed 17-Feb-2023 15:14)   Authored: Send Summary, Summary Content, Immunizations,  Ongoing Care, Note Completion      Last Updated: 17-Feb-2023 15:14 by Kuldeep Gonzalez)

## 2023-09-14 NOTE — H&P
History of Present Illness:   HPI:    ELLIOT CAAL is a 89 year old patient who normally lives with his wife; however, his wife is sick and hospitalized at [facility] . Patient was home alone, he fell  last night reportedly between 7 and 8 and has been on the floor all night.  He was brought to the emergency room for evaluation with history after he fell hit his head against the table in the kitchen was on the floor was unable to get up.  Patient daughter  went to check on him and found him on the floor and stated that he was fine last night at 7:30 PM when she talked to him of the phone last night/  She found him on the phone with swelling of the face his eyes were all shut down due to edema of the face.  He could only give limited history denies any neck pain chest pain or short of breath.  He was noted abrasion of forearm.  Possibly when he tried  to get up.  Patient is not using any blood thinner.    In the ED, Repeat EKG done at 1525 hrs. showed pacer rate of 81.  Lateral infarct age undetermined.  No ST-T wave elevation.  No STEMI.  Abnormal EKG.  I read this EKG.    The patient CT of the head and C-spine as well as facial bones showed no acute fracture. He was found to have acute sinusitis but no posttraumatic abnormality except soft tissue swelling of the face and isolated from part of body being on the floor for  prolonged period of time.  CT of the chest abdomen pelvis showed no acute intra thoracic or abdominal posttraumatic abnormality CT, see CT report for details.  Patient troponin however was 120, 128 and 139 slightly creeping up.  His EKG showed pacemaker rhythm with nonspecific T wave changes no STEMI.    Rest of the labs are also reviewed with the patient and family.    Patient gradually became more awake his puffiness and swelling is markedly improved he is able to open his eyes, the puffiness and edema has markedly improved when he put his head in upright position causing edema to  decrease.  He was able to talk.  Patient  expressed desire for no intervention.  Initial discussion about transferring patient to higher level facility surgery [place] with decline in the family initially thought about sending patient [facility] or [facility]. [facility] declined to take the patient  reported request [facility] however patient and family decided no intervention at no cardiac cath no intervention no surgery they want patient to be DNR Comfort Care arrest and do not want any interventional procedure.  Not even cardiac cath  evaluation.  They agree they want him only comfort care arrest with only symptomatic treatment.  Patient and family understood the serious nature of the decision and major impact about patient medical care, they understood risk and benefit well patient  does not want any intervention or procedures and want comfort care and symptomatic treatment only.  Since he lives alone he cannot go home his wife in the hospital and is not safe to be home alone and they decided [facility] care and if gets discharged he will go home with her otherwise stable discussed different options including placement to skilled care facility or rehab.  Patient without any chest pain or short of breath.  He is being admitted for further  care.  But no cardiac intervention and no surgery per patient family and patient did not instructions on the DNR Comfort Care arrest.     Patient is admitted to medicine for possible placement and/or rehab    Family History:   Family History: reviewed and not pertinent to presenting  problem     Social History:   Social History:  Smoking Status never smoker (1)   Alcohol Use denies(1)   Drug Use denies (1)              Allergies:  ·  No Known Allergies :     Medications Prior to Admission:     levothyroxine 200 mcg (0.2 mg) oral tablet: 1 tab(s) orally once a day  Tylenol 325 mg oral capsule: 2 tab(s) orally every 6 hours, As  Needed  Aspir 81 oral delayed release tablet: 1 tab(s) orally once a day  Erleada 60 mg oral tablet: 4 tab(s) orally once a day  Colace 100 mg oral capsule: 1 cap(s) orally once a day, As Needed  lactulose 10 g/15 mL oral syrup: 15 milliliter(s) orally once a day (at bedtime), As Needed  mirtazapine 15 mg oral tablet: 1 tab(s) orally once a day (at bedtime)  furosemide 40 mg oral tablet: 1 tab(s) orally once a day  spironolactone 25 mg oral tablet: 1 tab(s) orally once a day  Os-Claude 250 with D oral tablet: 1 tab(s) orally 2 times a day  Vimpat 150 mg oral tablet: 1 tab(s) orally 2 times a day.    Review of Systems:   Constitutional: NEGATIVE: Fever, Chills, Anorexia,  Weight Loss, Malaise     Eyes: NEGATIVE: Blurry Vision, Drainage, Diploplia,  Redness, Vision Loss/ Change     ENMT: NEGATIVE: Nasal Discharge, Nasal Congestion,  Ear Pain, Mouth Pain, Throat Pain     Respiratory: NEGATIVE: Dry Cough, Productive Cough,  Hemoptysis, Wheezing, Shortness of Breath     Cardiac: NEGATIVE: Chest Pain, Dyspnea on Exertion,  Orthopnea, Palpitations, Syncope     Gastrointestinal: NEGATIVE: Nausea, Vomiting, Diarrhea,  Constipation, Abdominal Pain     Genitourinary: NEGATIVE: Discharge, Dysuria, Flank  Pain, Frequency, Hematuria     Musculoskeletal: NEGATIVE: Decreased ROM, Pain, Swelling,  Stiffness, Weakness     Neurological: NEGATIVE: Dizziness, Confusion, Headache,  Seizures, Syncope     Psychiatric: NEGATIVE: Mood Changes, Anxiety, Hallucinations,  Sleep Changes, Suicidal Ideas     Skin: NEGATIVE: Mass, Pain, Pruritus, Rash, Ulcer     Endocrine: NEGATIVE: Heat Intolerance, Cold Intolerance,  Sweat, Polyuria, Thirst     Hematologic/Lymph: NEGATIVE: Anemia, Bruising, Easy  Bleeding, Night Sweats, Petechiae       Objective:     Objective Information:      T   P  R  BP   MAP  SpO2   Value  36.2  62  18  91/43   72  98%  Date/Time 2/17 8:00 2/17 8:00 2/17 8:00 2/17 8:00  2/16 17:26 2/17 8:00  Range  (36.2C - 37C )  (62 - 82 )   (14 - 20 )  (76 - 105 )/ (43 - 67 )  (72 - 77 )  (87% - 100% )   As of 17-Feb-2023 03:00:00, patient is on 3 L/min of oxygen via nasal cannula with humidification.  Highest temp of 37 C was recorded at 2/16 11:57      Pain reported at 2/16 20:00: 0 = None    Physical Exam by System:    Constitutional: Well developed, awake/alert/oriented  x3, no distress, alert and cooperative   Eyes: PERRL, EOMI, clear sclera   ENMT: mucous membranes moist, no apparent injury,  no lesions seen   Head/Neck: Neck supple, no apparent injury, thyroid  without mass or tenderness, No JVD, trachea midline, no bruits   Respiratory/Thorax: Patent airways, CTAB, normal  breath sounds with good chest expansion, thorax symmetric   Cardiovascular: Regular, rate and rhythm, no murmurs,  2+ equal pulses of the extremities, normal S 1and S 2   Gastrointestinal: Nondistended, soft, non-tender,  no rebound tenderness or guarding, no masses palpable, no organomegaly, +BS, no bruits   Genitourinary: No Discharge, vesicles or other abnormalities   Musculoskeletal: ROM intact, no joint swelling, normal  strength   Extremities: normal extremities, no cyanosis edema,  contusions or wounds, no clubbing   Neurological: alert and oriented x3, intact senses,  motor, response and reflexes, normal strength   Skin: Warm and dry, no lesions, no rashes     Medications:    Medications:          Continuous Medications       --------------------------------    1. Sodium Chloride 0.9% Infusion:  1000  mL  IntraVenous  <Continuous>         Scheduled Medications       --------------------------------    1. Aspirin Enteric Coated:  81  mg  Oral  Daily    2. Calcium 500 mg - Vitamin D 200 Units:  1  tablet(s)  Oral  2 Times a Day    3. Furosemide:  40  mg  Oral  Daily    4. Lacosamide:  150  mg  Oral  2 Times a Day    5. Lactulose  Oral Liquid - PEDS:  10  gram(s)  Oral  Every Night    6. Levothyroxine:  200  microgram(s)  Oral  Daily    7. Mirtazapine  - PEDS:  15  mg   Oral  Every Night    8. Spironolactone:  25  mg  Oral  Daily         PRN Medications       --------------------------------    1. Acetaminophen:  650  mg  Oral  Every 4 Hours    2. Acetaminophen:  650  mg  Oral  Every 4 Hours    3. Docusate:  100  mg  Oral  2 Times a Day    4. Ondansetron Injectable:  4  mg  IntraVenous Push  Every 4 Hours    5. Sodium Chloride 0.9% Injectable Flush:  10  mL  IntraVenous Flush  Every 8 Hours and as Needed        Recent Lab Results:    Results:    CBC: 2/17/2023 03:35              \     Hgb     /                              \     13.2 L    /  WBC  ----------------  Plt               6.2       ----------------    175              /     Hct     \                              /     40.0 L    \            RBC: 4.14 L    MCV: 97     Neutrophil %: 89.2      BMP: 2/17/2023 03:35  NA+        Cl-     BUN  /                         137    106    26 H /  --------------------------------  Glucose                ---------------------------  82    K+     HCO3-   Creat \                         4.1  27    0.88  \  Calcium : 8.3 L    Anion Gap : 8 L      CMP: 2/16/2023 12:15  NA+        Cl-     BUN  /                         138    102    29 H /  --------------------------------  Glucose                ---------------------------  120 H    K+     HCO3-   Creat \                         4.5    26    1.09  \           \  T Bili  /                    \  1.1  /  AST  x ---- x ALT        38 x ---- x 19         /  Alk P   \               /  83  \  Calcium : 9.1     Anion Gap : 15     Albumin : 3.7     T Protein : 6.3 L          Coagulation: 2/16/2023 12:15  PT  /                    15.2 H /  -------<    INR          ----------<      1.3 H  PTT\                              \                         I have reviewed these laboratory results:    Complete Blood Count  17-Feb-2023 03:35:00      Result Value    White Blood Cell Count  6.2    Red Blood Cell Count  4.14   L   HGB  13.2   L   HCT   40.0   L   MCV  97    MCHC  33.0    PLT  175    RDW-CV  13.8      Basic Metabolic Panel  17-Feb-2023 03:35:00      Result Value    Glucose, Serum  82    NA  137    K  4.1    CL  106    Bicarbonate, Serum  27    Anion Gap, Serum  8   L   BUN  26   H   CREAT  0.88    GFR Male  81    Calcium, Serum  8.3   L     Troponin I, High Sensitivity  Trending View      Result 17-Feb-2023 03:35:00  17-Feb-2023 00:35:00  16-Feb-2023 21:33:00  16-Feb-2023 15:23:00  16-Feb-2023 13:20:00    Troponin I, High Sensitivity 126   H   143   H   161   H   149   H   138   H        Influenza A/B,Covid 2019 PCR,Symptomatic  16-Feb-2023 12:43:00      Result Value    Fluid Source  Nasal, Nasopharyngeal    Influenza A PCR  NOT DETECTED    Reference Range: Not Detected Respiratory virus testing is performed routinely by PCR    for Influenza A/B and RSV.    Not Detected results do not preclude Influenza A/B or RSV   infections since the adequacy of sample collection or lo    Influenza B PCR  NOT DETECTED    Reference Range: Not Detected Respiratory virus testing is performed routinely by PCR    for Influenza A/B and RSV.    Not Detected results do not preclude Influenza A/B or RSV   infections since the adequacy of sample collection or lo    Coronavirus 2019,PCR  NOT DETECTED    Reference Range: Not Detected .  This test has received FDA Emergency Use Authorization (EUA) and has been   verified by [facility]. This test is only   authorized for the duration of time that circums      Complete Blood Count + Differential  16-Feb-2023 12:15:00      Result Value    White Blood Cell Count  7.8    Red Blood Cell Count  4.27   L   HGB  13.6    HCT  41.2    MCV  96    MCHC  33.0    PLT  186    RDW-CV  13.7    Neutrophil %  89.2    Immature Granulocytes %  0.6    Lymphocyte %  3.4    Monocyte %  6.7    Basophil %  0.1    Neutrophil Count  6.91   H   Lymphocyte Count  0.26   L   Monocyte Count  0.52    Basophil Count  0.01       Comprehensive Metabolic Panel  16-Feb-2023 12:15:00      Result Value    Glucose, Serum  120   H   NA  138    K  4.5    CL  102    Bicarbonate, Serum  26    Anion Gap, Serum  15    BUN  29   H   CREAT  1.09    GFR Male  64    Calcium, Serum  9.1    ALB  3.7    ALKP  83    T Pro  6.3   L   T Bili  1.1    Alanine Aminotransferase, Serum  19    Aspartate Transaminase, Serum  38        Radiology Results:    Results:    No Results have been selected.  Please select Results from the Available Results list before marking as Reviewed.      Impression:    No CT evidence of facial bone fracture in this exam.     Nonspecific edema throughout the subcutaneous fat the face (mildly  greater on the right than on the left) and the neck (prominently  greater on the right than on the left).     Paranasal sinusitis as described.     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     CT Image Reconstruction 3D Volume and MIP [Feb 16 2023  1:39PM]      Impression:    No CT evidence of facial bone fracture in this exam.     Nonspecific edema throughout the subcutaneous fat the face (mildly  greater on the right than on the left) and the neck (prominently  greater on the right than on the left).     Paranasal sinusitis as described.     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     CT Facial Bones [Feb 16 2023  1:36PM]      Impression:    No CT evidence of cervical spine fracture in this exam.     Cervical spine DJD throughout as described.     Diffuse edema throughout the subcutaneous fat the neck, right much  greater.     CT C Spine without Contrast [Feb 16 2023  1:28PM]      Impression:    CHEST:  The exam was limited by non use of IVcontrast.     Old inferolateral right rib fracture deformities. Stable anterior  wedge deformity at T12. No acute, displaced rib fracture either side.  No acute thoracic spine fracture.     Previous heart surgery. Cardiomegaly.     Diffuse edema throughout the  subcutaneous fat of the chest wall,  right greater than left. Also, there are bilateral pleural effusions  which are mild-to-moderate in size, partially loculated on right.  There is atelectasis in both lower lobes and laterally on right  throughout the right chest, mild right-sided volume. Question patient  laying on the right side when found. Please correlate clinically.     Mild stable aneurysm the ascending aorta. Suspected underlying  pulmonary artery hypertension.        ABDOMEN/PELVIS:  The exam was limited by non use of oral or IV contrast.     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 Mild DJD in both hips  and both SI joints. No fracture of the lumbar spine or pelvis based  this exam.     Moderate diffuse retained colonic stool.     Mild diffuse mesenteric edema and mild diffuse edema throughout the  subcutaneous fat. Suspect anasarca. Cannot exclude mild free pelvic.     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 There  are multiple stones in mural diverticula laterally and posteriorly on  the right. No intraluminal bladder stone.     Several tiny nonobstructing bilateral renal stones.     Moderate aortoiliac calcifications.     Arthritic changes in the spine and pelvis as described. No CT  evidence of lumbar spine or pelvic fracture.     CT Chest Abdomen Pelvis without Contrast [Feb 16 2023  1:24PM]      Impression:    Advanced degenerative changes of the right wrist as described.     Ossific density dorsal to the carpal bones may be due to triquetral  injury of indeterminate age.     Xray Wrist Complete Min 3 View [Feb 16 2023  1:13PM]      Impression:    No evidence of acute cortical infarct or intracranial hemorrhage.     No evidence of intracranial hemorrhage or displaced skullfracture.     CT Head without Contrast [Feb 16 2023 12:46PM]      Assessment and Plan:   Assessment:    ELLIOT CAAL is a 89 year old patient who normally lives with his wife; however, his wife is sick  and hospitalized at [facility] . Patient was home alone, he fell  last night reportedly between 7 and 8 and has been on the floor all night.  He was brought to the emergency room for evaluation with history after he fell hit his head against the table in the kitchen was on the floor was unable to get up.  Patient daughter  went to check on him and found him on the floor and stated that he was fine last night at 7:30 PM when she talked to him of the phone last night/  She found him on the phone with swelling of the face his eyes were all shut down due to edema of the face.  He could only give limited history denies any neck pain chest pain or short of breath.  He was noted abrasion of forearm.  Possibly when he tried  to get up.  Patient is not using any blood thinner.    In the ED, Repeat EKG done at 1525 hrs. showed pacer rate of 81.  Lateral infarct age undetermined.  No ST-T wave elevation.  No STEMI.  Abnormal EKG.  I read this EKG.    The patient CT of the head and C-spine as well as facial bones showed no acute fracture. He was found to have acute sinusitis but no posttraumatic abnormality except soft tissue swelling of the face and isolated from part of body being on the floor for  prolonged period of time.  CT of the chest abdomen pelvis showed no acute intra thoracic or abdominal posttraumatic abnormality CT, see CT report for details.  Patient troponin however was 120, 128 and 139 slightly creeping up.  His EKG showed pacemaker rhythm with nonspecific T wave changes no STEMI.    Rest of the labs are also reviewed with the patient and family.    Patient gradually became more awake his puffiness and swelling is markedly improved he is able to open his eyes, the puffiness and edema has markedly improved when he put his head in upright position causing edema to decrease.  He was able to talk.  Patient  expressed desire for no intervention.  Initial discussion about transferring patient to higher level  facility surgery [place] with decline in the family initially thought about sending patient [facility] or [facility]. [facility] declined to take the patient  reported request [facility] however patient and family decided no intervention at no cardiac cath no intervention no surgery they want patient to be DNR Comfort Care arrest and do not want any interventional procedure.  Not even cardiac cath  evaluation.  They agree they want him only comfort care arrest with only symptomatic treatment.  Patient and family understood the serious nature of the decision and major impact about patient medical care, they understood risk and benefit well patient  does not want any intervention or procedures and want comfort care and symptomatic treatment only.  Since he lives alone he cannot go home his wife in the hospital and is not safe to be home alone and they decided patient to be admitted to [facility]  for observation care and if gets discharged he will go home with her otherwise stable discussed different options including placement to skilled care facility or rehab.  Patient without any chest pain or short of breath.  He is being admitted for further  care.  But no cardiac intervention and no surgery per patient family and patient did not instructions on the DNR Comfort Care arrest.     Patient is admitted to medicine for possible placement and/or rehab    1) deconditioning and mechanical fall- no permanent trauma; no bleeding or fracture-  continue PT and OT; will qualify for swing    2) Rhabdomyolysis.  I do not see a cpk order, so I will recheck and monitor renal function    3) gait abnormality- continue current management; PT/OT- patient would be good candidate for SNF/rehab      Electronic Signatures:  Kuldeep Gonzalez)  (Signed 17-Feb-2023 15:05)   Authored: History of Present Illness, Comorbidities,  Family History, Social History, Allergies, Medications Prior to Admission, Review of Systems, Objective,  "Assessment and Plan, Note Completion      Last Updated: 17-Feb-2023 15:05 by Kuldeep Gonzalez)    References:  1.  Data Referenced From \"Patient Profile - Adult v2\" 16-Feb-2023 18:26   "